# Patient Record
(demographics unavailable — no encounter records)

---

## 2025-01-19 NOTE — HISTORY OF PRESENT ILLNESS
[de-identified] : sick [FreeTextEntry6] : reported h/o seasonal asthma c/o cough, congestion for over a month went to [facility] [place] where he was given prednisone 5d and told to use albuterol was using albuterol TID then BID, not consistent at [facility] [place] flu and covid were negative his symptoms are improving no fever tolerating PO

## 2025-01-19 NOTE — DISCUSSION/SUMMARY
[FreeTextEntry1] : Wheezing and persistent cough - CXR - RVP - Albuterol q4h for 48hrs then wean to q6h and as tolerated.  If unable to wean after 48hrs, must seek medical attention. - Return precautions reviewed. Patient to seek medical attention in ED if has decreased oral intake, decrease in wet diapers/voids, fever >100.4F, difficulty breathing, becomes lethargic, or has a change in mental status or alertness. To note if fever > 5 days must be seen immediately either in clinic or in ED. - Return/ED precautions given.  RTC routine and prn.  Caretaker expressed understanding and all questions answered.

## 2025-01-19 NOTE — HISTORY OF PRESENT ILLNESS
[de-identified] : sick [FreeTextEntry6] : reported h/o seasonal asthma c/o cough, congestion for over a month went to [facility] [place] where he was given prednisone 5d and told to use albuterol was using albuterol TID then BID, not consistent at [facility] [place] flu and covid were negative his symptoms are improving no fever tolerating PO